# Patient Record
(demographics unavailable — no encounter records)

---

## 2025-02-27 NOTE — REASON FOR VISIT
[Initial Evaluation] : an initial evaluation of [UTI] : urinary tract infection [Hydronephrosis] : hydronephrosis [Patient] : patient [Mother] : mother

## 2025-02-27 NOTE — CONSULT LETTER
[Dear  ___] : Dear  [unfilled], [Consult Letter:] : I had the pleasure of evaluating your patient, [unfilled]. [Please see my note below.] : Please see my note below. [Consult Closing:] : Thank you very much for allowing me to participate in the care of this patient.  If you have any questions, please do not hesitate to contact me. [Sincerely,] : Sincerely, [[email or web address]] : Emily Hoyos MD Pediatric Nephrologist [facility] [phone]